# Patient Record
Sex: FEMALE | Race: WHITE
[De-identification: names, ages, dates, MRNs, and addresses within clinical notes are randomized per-mention and may not be internally consistent; named-entity substitution may affect disease eponyms.]

---

## 2017-07-02 ENCOUNTER — HOSPITAL ENCOUNTER (EMERGENCY)
Dept: HOSPITAL 11 - JP.ED | Age: 57
Discharge: HOME | End: 2017-07-02
Payer: COMMERCIAL

## 2017-07-02 VITALS — SYSTOLIC BLOOD PRESSURE: 146 MMHG | DIASTOLIC BLOOD PRESSURE: 95 MMHG

## 2017-07-02 DIAGNOSIS — Z98.890: ICD-10-CM

## 2017-07-02 DIAGNOSIS — I10: ICD-10-CM

## 2017-07-02 DIAGNOSIS — Z90.49: ICD-10-CM

## 2017-07-02 DIAGNOSIS — Z88.8: ICD-10-CM

## 2017-07-02 DIAGNOSIS — Z79.899: ICD-10-CM

## 2017-07-02 DIAGNOSIS — Z79.82: ICD-10-CM

## 2017-07-02 DIAGNOSIS — R07.89: Primary | ICD-10-CM

## 2017-07-02 NOTE — EDM.PDOC
[id number]   4d


CHEST PAIN


Time Seen by Provider: 07/02/17 19:00


Source of Information: Reports: Patient, Family


History Limitations: Reports: No Limitations





- History of Present Illness


INITIAL COMMENTS - FREE TEXT/NARRATIVE: 





49-year-old female with a history of hypertension but no other cardiac history 

presents with intermittent chest tightness over the past several weeks, worse 

today especially over the past hour. She has a sensation of tightness in the 

upper chest with some slight pain radiating to the neck and epigastric area, 

not related to activity but does get worse with a deep breath. No shortness of 

breath, denies nausea or vomiting, had some significant heartburn earlier 

today. She walks regularly and does not get symptoms with activity. Now that 

she is here and relaxing she has minimal if any symptoms. She has never had any 

cardiac testing or workups. Today the discomfort started about 1-1/2 hours ago 

and when it wasn't settling down she decided to come in and have it checked. She

's been waking up at night with palpitations, feeling like her heart is beating 

heavy and at times racing.


Onset: Unknown/Unsure


Location: Reports: Chest


Quality: Reports: Pressure


Severity: Mild


Improves with: Reports: Rest (Seems to improve if she tries to calm down, she 

is very anxious)


Associated Symptoms: Reports: Chest Pain (More of a pressure sensation).  Denies

: Diaphoresis, Fever/Chills, Headaches, Nausea/Vomiting, Shortness of Breath


  ** Upper Chest


Pain Score (Numeric/FACES): 3





- Related Data


 Allergies











Allergy/AdvReac Type Severity Reaction Status Date / Time


 


hydrochlorothiazide Allergy  Other Verified 07/02/17 18:43











Home Meds: 


 Home Meds





Aspirin 81 mg PO DAILY 07/02/17 [History]


Fish Oil/Omega-3 Fatty Acids [Fish Oil 1,000 MG] 1 gm PO BID 07/02/17 [History]


Lisinopril 40 mg PO DAILY 07/02/17 [History]


Metoprolol Succinate [Toprol XL] 50 mg PO DAILY 07/02/17 [History]


Phenytoin 300 mg PO DAILY 07/02/17 [History]


Potassium Chloride 10 meq PO DAILY 07/02/17 [History]


amLODIPine [Norvasc] 10 mg PO DAILY 07/02/17 [History]











Past Medical History


HEENT History: Reports: Impaired Vision


Cardiovascular History: Reports: Hypertension


Musculoskeletal History: Reports: Fracture


Neurological History: Reports: Seizure





- Past Surgical History


GI Surgical History: Reports: Appendectomy


Other Female  Surgeries/Procedures: falopian tube surgery.


Musculoskeletal Surgical History: Reports: Arthroscopic Knee


Other Musculoskeletal Surgeries/Procedures:: left arm fracture





Social & Family History





- Tobacco Use


Smoking Status *Q: Never Smoker





- Caffeine Use


Caffeine Use: Reports: Coffee





- Recreational Drug Use


Recreational Drug Use: No





ED ROS GENERAL





- Review of Systems


Review Of Systems: See Below


Constitutional: Denies: Fever, Chills, Malaise


HEENT: Reports: No Symptoms


Respiratory: Reports: Other (Pain with breathing).  Denies: Shortness of Breath


Cardiovascular: Reports: Chest Pain, Palpitations (At night especially)


GI/Abdominal: Reports: Other (Fairly frequent reflux symptoms, heartburn)


: Reports: No Symptoms


Musculoskeletal: Reports: No Symptoms


Skin: Reports: No Symptoms


Neurological: Denies: Dizziness, Headache


Psychiatric: Reports: Anxiety





ED EXAM, GENERAL





- Physical Exam


Exam: See Below


Exam Limited By: No Limitations


General Appearance: Alert, No Apparent Distress


Eye Exam: Bilateral Eye: EOMI


Neck: Normal Inspection


Respiratory/Chest: No Respiratory Distress, Lungs Clear, Other (I can reproduce 

chest discomfort with palpation around the sternum, also the epigastric area 

provide some mild discomfort)


Cardiovascular: Regular Rate, Rhythm.  No: Extra Beats


GI/Abdominal: Soft, Other (Mild tenderness or discomfort with palpation in the 

upper abdomen)


Extremities: Normal Inspection.  No: Pedal Edema


Neurological: Alert, Oriented


Psychiatric: Anxious


Skin Exam: Warm, Dry





EKG INTERPRETATION


Rhythm: NSR





Course





- Vital Signs


Last Recorded V/S: 


 Last Vital Signs











Temp  98.1 F   07/02/17 18:47


 


Pulse  55 L  07/02/17 20:19


 


Resp  16   07/02/17 20:19


 


BP  146/95 H  07/02/17 20:19


 


Pulse Ox  97   07/02/17 20:19














- Orders/Labs/Meds


Orders: 


 Active Orders 24 hr











 Category Date Time Status


 


 EKG Documentation Completion [RC] ASDIRECTED Care  07/02/17 19:16 Active


 


 Chest 2V [CR] Routine Exams  07/02/17 19:15 Taken


 


 EKG 12 Lead [EK] Routine Ther  07/02/17 19:16 Ordered











Labs: 


 Laboratory Tests











  07/02/17 07/02/17 07/02/17 Range/Units





  19:25 19:25 19:25 


 


WBC  5.3    (4.5-11.0)  K/uL


 


RBC  3.83    (3.30-5.50)  M/uL


 


Hgb  12.1    (12.0-15.0)  g/dL


 


Hct  35.6 L    (36.0-48.0)  %


 


MCV  93    (80-98)  fL


 


MCH  32 H    (27-31)  pg


 


MCHC  34    (32-36)  %


 


Plt Count  212    (150-400)  K/uL


 


Neut % (Auto)  39    (36-66)  %


 


Lymph % (Auto)  48 H    (24-44)  %


 


Mono % (Auto)  10 H    (2-6)  %


 


Eos % (Auto)  2    (2-4)  %


 


Baso % (Auto)  1    (0-1)  %


 


D-Dimer, Quantitative   < 100   (0.0-400.0)  ng/mL


 


Sodium    139 L  (140-148)  mmol/L


 


Potassium    3.7  (3.6-5.2)  mmol/L


 


Chloride    104  (100-108)  mmol/L


 


Carbon Dioxide    29  (21-32)  mmol/L


 


Anion Gap    9.7  (5.0-14.0)  mmol/L


 


BUN    18  (7-18)  mg/dL


 


Creatinine    0.8  (0.6-1.0)  mg/dL


 


Est Cr Clr Drug Dosing    73.15  mL/min


 


Estimated GFR (MDRD)    > 60  (>60)  


 


Glucose    104  ()  mg/dL


 


Calcium    8.6  (8.5-10.1)  mg/dL


 


Total Bilirubin    0.2  (0.2-1.0)  mg/dL


 


AST    21  (15-37)  U/L


 


ALT    23  (12-78)  U/L


 


Alkaline Phosphatase    72  ()  U/L


 


Troponin I    < 0.017  (0.000-0.056)  ng/mL


 


Total Protein    6.2 L  (6.4-8.2)  g/dL


 


Albumin    3.6  (3.4-5.0)  g/dL


 


Globulin    2.6  (2.3-3.5)  g/dL


 


Albumin/Globulin Ratio    1.4  (1.2-2.2)  














- Re-Assessments/Exams


Free Text/Narrative Re-Assessment/Exam: 





07/02/17 19:21


An EKG was done and showed no acute findings. Her blood pressure initially was 

172/112, was improving when I spoke with the patient at 160/100. She admitted 

she was feeling better. She was reassured that the EKG looks normal. A two-view 

chest x-ray, d-dimer, troponin, CMP and CBC will be obtained, no treatment at 

this time.


07/02/17 20:13


Labs returned very reassuring but her chest x-ray showed slight cardiomegaly 

with evidence of vascular congestion. Her blood pressure continued to normalize 

but did not become normal. Her symptoms did not recur. I think this patient 

would benefit from increasing her metoprolol to twice daily, and talking with 

her primary care provider about setting up an echocardiogram and stress test. 

She will return sooner if worsening.





Departure





- Departure


Time of Disposition: 20:47


Disposition: Home, Self-Care 01


Condition: Good


Clinical Impression: 


 Chest pain, atypical





Hypertension


Qualifiers:


 Hypertension type: essential hypertension Qualified Code(s): I10 - Essential (

primary) hypertension








- Discharge Information


Instructions:  Nonspecific Chest Pain, Easy-to-Read, Hypertension, Easy-to-Read


Referrals: 


PCP,None [Primary Care Provider] - 


Forms:  ED Department Discharge


Care Plan Goals: 


Talk to your primary care provider tomorrow about increasing your metoprolol 

and setting up future tests such as echocardiogram and stress test. Return to 

the emergency room at any time if you feel you're worsening or need more urgent 

care. Avoid any extra salt intake. I would also recommend more frequent blood 

pressure checks at home such as mornings and evenings and if you wake up at 

night with symptoms.





- My Orders


Last 24 Hours: 


My Active Orders





07/02/17 19:15


Chest 2V [CR] Routine 





07/02/17 19:16


EKG Documentation Completion [RC] ASDIRECTED 


EKG 12 Lead [EK] Routine 














- Assessment/Plan


Last 24 Hours: 


My Active Orders





07/02/17 19:15


Chest 2V [CR] Routine 





07/02/17 19:16


EKG Documentation Completion [RC] ASDIRECTED 


EKG 12 Lead [EK] Routine

## 2017-07-03 NOTE — CR
Heart size upper limits of normal. No focal consolidation. Probable nipple shadow right lower lobe w
hich could be confirmed with a nipple marker follow-up. No overt heart failure.

## 2020-12-21 ENCOUNTER — HOSPITAL ENCOUNTER (OUTPATIENT)
Dept: HOSPITAL 11 - JP.SDS | Age: 60
Discharge: HOME | End: 2020-12-21
Attending: SURGERY
Payer: MEDICAID

## 2020-12-21 VITALS — SYSTOLIC BLOOD PRESSURE: 169 MMHG | DIASTOLIC BLOOD PRESSURE: 89 MMHG

## 2020-12-21 VITALS — HEART RATE: 56 BPM

## 2020-12-21 DIAGNOSIS — Z88.8: ICD-10-CM

## 2020-12-21 DIAGNOSIS — Z12.11: Primary | ICD-10-CM

## 2020-12-21 PROCEDURE — 45378 DIAGNOSTIC COLONOSCOPY: CPT

## 2020-12-21 NOTE — OR
DATE OF PROCEDURE:

 

SURGEON:  Jaylan Goodman MD

 

PROCEDURE:  Colonoscopy.

 

FINDINGS:  Normal colonoscopy.

 

PREOPERATIVE DIAGNOSIS:  Screening colonoscopy.

 

POSTOPERATIVE DIAGNOSIS:  Screening colonoscopy.

 

RISKS:  Risks, benefits, alternatives, and limitations including, but not limited to

infection, bleeding, and perforation were explained to the patient who wished to proceed.

 

PROCEDURE IN DETAIL:  The patient was placed in left lateral decubitus position.  Digital

rectal exam was performed without abnormality.  Scope was introduced atraumatically to the

ileocecal valve.  A photo was taken of this.  Scope was brought back to the ascending,

transverse, descending colon, and retroflexed.

 

No evidence of old or new blood.  No masses.  No polyps.  No diverticulosis.  No colitis.

No abnormalities on retroflexion.  Greater than 8 minutes was spent removing the scope.  The

patient tolerated the procedure well.

 

 

 

 

Jaylan Goodman MD

DD:  12/21/2020 08:57:27

DT:  12/21/2020 09:12:55

Job #:  [id number]

## 2022-11-15 ENCOUNTER — HOSPITAL ENCOUNTER (EMERGENCY)
Dept: HOSPITAL 11 - JP.ED | Age: 62
Discharge: HOME | End: 2022-11-15
Payer: MEDICAID

## 2022-11-15 VITALS — HEART RATE: 72 BPM

## 2022-11-15 VITALS — DIASTOLIC BLOOD PRESSURE: 91 MMHG | SYSTOLIC BLOOD PRESSURE: 196 MMHG

## 2022-11-15 DIAGNOSIS — Z90.49: ICD-10-CM

## 2022-11-15 DIAGNOSIS — S52.502A: Primary | ICD-10-CM

## 2022-11-15 DIAGNOSIS — I10: ICD-10-CM

## 2022-11-15 DIAGNOSIS — S52.615A: ICD-10-CM

## 2022-11-15 DIAGNOSIS — Z88.8: ICD-10-CM

## 2022-11-15 DIAGNOSIS — Z79.82: ICD-10-CM
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
## 2022-11-15 DIAGNOSIS — Z79.899: ICD-10-CM

## 2022-11-15 PROCEDURE — 96374 THER/PROPH/DIAG INJ IV PUSH: CPT

## 2022-11-15 PROCEDURE — 73100 X-RAY EXAM OF WRIST: CPT

## 2022-11-15 PROCEDURE — 24655 CLTX RDL HEAD/NCK FX W/MNPJ: CPT

## 2022-11-15 PROCEDURE — 99283 EMERGENCY DEPT VISIT LOW MDM: CPT

## 2022-11-15 PROCEDURE — 73110 X-RAY EXAM OF WRIST: CPT

## 2023-10-06 ENCOUNTER — HOSPITAL ENCOUNTER (OUTPATIENT)
Dept: HOSPITAL 11 - JP.SDS | Age: 63
Discharge: HOME | End: 2023-10-06
Attending: SURGERY
Payer: MEDICAID

## 2023-10-06 VITALS — DIASTOLIC BLOOD PRESSURE: 80 MMHG | SYSTOLIC BLOOD PRESSURE: 124 MMHG | HEART RATE: 56 BPM

## 2023-10-06 DIAGNOSIS — Z86.69: ICD-10-CM

## 2023-10-06 DIAGNOSIS — R19.4: Primary | ICD-10-CM

## 2023-10-06 DIAGNOSIS — I10: ICD-10-CM

## 2023-10-06 DIAGNOSIS — Z88.8: ICD-10-CM

## 2023-10-06 PROCEDURE — 45378 DIAGNOSTIC COLONOSCOPY: CPT
